# Patient Record
Sex: MALE | Race: WHITE | NOT HISPANIC OR LATINO | Employment: FULL TIME | URBAN - METROPOLITAN AREA
[De-identification: names, ages, dates, MRNs, and addresses within clinical notes are randomized per-mention and may not be internally consistent; named-entity substitution may affect disease eponyms.]

---

## 2023-06-23 ENCOUNTER — APPOINTMENT (OUTPATIENT)
Dept: PRIMARY CARE | Facility: CLINIC | Age: 33
End: 2023-06-23
Payer: COMMERCIAL

## 2023-09-20 ENCOUNTER — OFFICE VISIT (OUTPATIENT)
Dept: PRIMARY CARE | Facility: CLINIC | Age: 33
End: 2023-09-20
Payer: COMMERCIAL

## 2023-09-20 VITALS
SYSTOLIC BLOOD PRESSURE: 130 MMHG | HEART RATE: 77 BPM | TEMPERATURE: 98.2 F | WEIGHT: 198 LBS | HEIGHT: 71 IN | BODY MASS INDEX: 27.72 KG/M2 | DIASTOLIC BLOOD PRESSURE: 73 MMHG

## 2023-09-20 DIAGNOSIS — R10.9 FLANK PAIN: ICD-10-CM

## 2023-09-20 DIAGNOSIS — R51.9 CHRONIC DAILY HEADACHE: Primary | ICD-10-CM

## 2023-09-20 DIAGNOSIS — Z00.00 VISIT FOR PREVENTIVE HEALTH EXAMINATION: ICD-10-CM

## 2023-09-20 PROCEDURE — 99214 OFFICE O/P EST MOD 30 MIN: CPT | Performed by: FAMILY MEDICINE

## 2023-09-20 PROCEDURE — 1036F TOBACCO NON-USER: CPT | Performed by: FAMILY MEDICINE

## 2023-09-20 ASSESSMENT — PATIENT HEALTH QUESTIONNAIRE - PHQ9
2. FEELING DOWN, DEPRESSED OR HOPELESS: NOT AT ALL
1. LITTLE INTEREST OR PLEASURE IN DOING THINGS: NOT AT ALL
SUM OF ALL RESPONSES TO PHQ9 QUESTIONS 1 AND 2: 0

## 2023-09-20 ASSESSMENT — ENCOUNTER SYMPTOMS: HEADACHES: 1

## 2023-09-20 NOTE — PROGRESS NOTES
Subjective   Patient ID: Bebo George is a 33 y.o. male who presents for Headache (Pt is having headache on one side only on and off./Pt is having pain on the left side of rib cage./Pt is having pain on the elbow when touch .).  Chronic daily headache gettibng worse  Some tinnitis   history  Chronic daily headache for the last 4 to 6 weeks almost 2 months  Worsened by activity  No visual issues  Starting to get some occasional nausea  Notices it when he bends down or picks his head up quickly  No behavior changes but he is starting to get tired  History of head trauma and  remotely    Headache   This is a chronic problem. The current episode started more than 1 month ago. The problem occurs daily. The problem has been gradually worsening. The pain is located in the Bilateral region. The pain does not radiate. The pain quality is not similar to prior headaches. The quality of the pain is described as aching and stabbing. The pain is moderate. Associated symptoms include nausea. Pertinent negatives include no abdominal pain, abnormal behavior, anorexia, back pain, blurred vision, coughing, dizziness, drainage, ear pain, eye pain, eye redness, eye watering, facial sweating, fever, hearing loss, insomnia, loss of balance, muscle aches, neck pain, numbness, phonophobia, photophobia, rhinorrhea, scalp tenderness, seizures, sinus pressure, sore throat, swollen glands, tingling, tinnitus, visual change, vomiting, weakness or weight loss. The symptoms are aggravated by activity. He has tried acetaminophen and NSAIDs for the symptoms. The treatment provided no relief. There is no history of cancer, cluster headaches, hypertension, immunosuppression, migraine headaches, migraines in the family, obesity, pseudotumor cerebri, recent head traumas, sinus disease or TMJ.       Review of Systems   Constitutional:  Negative for fever and weight loss.   HENT:  Negative for ear pain, hearing loss, rhinorrhea, sinus  "pressure, sore throat and tinnitus.    Eyes:  Negative for blurred vision, photophobia, pain and redness.   Respiratory:  Negative for cough.    Gastrointestinal:  Positive for nausea. Negative for abdominal pain, anorexia and vomiting.   Musculoskeletal:  Negative for back pain and neck pain.   Neurological:  Positive for headaches. Negative for dizziness, tingling, seizures, weakness, numbness and loss of balance.   Psychiatric/Behavioral:  The patient does not have insomnia.    Constitutional: no chills, no fever and no night sweats.   Eyes: no blurred vision and no eyesight problems.   ENT: no hearing loss, no nasal congestion, no nasal discharge, no hoarseness and no sore throat.   Cardiovascular: no chest pain, no intermittent leg claudication, no lower extremity edema, no palpitations and no syncope.   Respiratory: no cough, no shortness of breath during exertion, no shortness of breath at rest and no wheezing.   Gastrointestinal: no abdominal pain, no blood in stools, no constipation, no diarrhea, no melena, no nausea, no rectal pain and no vomiting.   Genitourinary: no dysuria, no change in urinary frequency, no urinary hesitancy, no feelings of urinary urgency and no vaginal discharge.   Musculoskeletal: no arthralgias,  no back pain and no myalgias.   Integumentary: no new skin lesions and no rashes.   Neurological: no difficulty walking, no headache, no limb weakness, no numbness and no tingling.   Psychiatric: no anxiety, no depression, no anhedonia and no substance use disorders.   Endocrine: no recent weight gain and no recent weight loss.   Hematologic/Lymphatic: no tendency for easy bruising and no swollen glands .      Objective    /73   Pulse 77   Temp 36.8 °C (98.2 °F)   Ht 1.803 m (5' 11\")   Wt 89.8 kg (198 lb)   BMI 27.62 kg/m²    Physical Exam  The patient appeared well nourished and normally developed. Vital signs as documented. Head exam is unremarkable. No scleral icterus or " corneal arcus noted.  Pupils are equal round reactive to light extraocular movements are intact no hemorrhages noted on funduscopic exam mouth mucous membranes are moist no exudates ears canals clear TMs are gray pearly not injected nose no rhinorrhea or epistaxis Neck is without jugular venous distension, thyromegaly, or carotid bruits. Carotid upstrokes are brisk bilaterally. Lungs are clear to auscultation and percussion. Cardiac exam reveals the PMI to be normally sized and situated. Rhythm is regular. First and second heart sounds normal. No murmurs, rubs or gallops. Abdominal exam reveals normal bowel sounds, no masses, no organomegaly and no aortic enlargement. Extremities are nonedematous and both femoral and pedal pulses are normal.  Neurologic exam DTRs are equal bilaterally no focal deficits strength is symmetrical heme lymph no palpable lymph nodes in the neck axilla or groin    Assessment/Plan   Problem List Items Addressed This Visit       Chronic daily headache - Primary    Relevant Orders    CT head wo IV contrast    Flank pain    Relevant Orders    Comprehensive Metabolic Panel    CBC     Other Visit Diagnoses       Visit for preventive health examination        Relevant Orders    Lipid Panel    Hemoglobin A1C             Chronic daily headache  No improvement no migraine symptoms  Check CT to make sure not an intracranial issue  If normal will refer to headache clinic  Flank pain check labs to make sure there is no underlying infection  Close observation and follow-up    Elena Ha MD

## 2023-09-25 LAB
NON-UH HIE A/G RATIO: 1.4
NON-UH HIE ALB: 4.5 G/DL (ref 3.4–5)
NON-UH HIE ALK PHOS: 69 UNIT/L (ref 46–116)
NON-UH HIE BILIRUBIN, TOTAL: 0.8 MG/DL (ref 0.2–1)
NON-UH HIE BUN/CREAT RATIO: 14.5
NON-UH HIE BUN: 16 MG/DL (ref 9–23)
NON-UH HIE CALCIUM: 9.8 MG/DL (ref 8.7–10.4)
NON-UH HIE CALCULATED LDL CHOLESTEROL: 61 MG/DL (ref 60–130)
NON-UH HIE CALCULATED OSMOLALITY: 280 MOSM/KG (ref 275–295)
NON-UH HIE CHLORIDE: 106 MMOL/L (ref 98–107)
NON-UH HIE CHOLESTEROL: 138 MG/DL (ref 100–200)
NON-UH HIE CO2, VENOUS: 30 MMOL/L (ref 20–31)
NON-UH HIE CREATININE: 1.1 MG/DL (ref 0.6–1.1)
NON-UH HIE GFR AA: >60
NON-UH HIE GLOBULIN: 3.2 G/DL
NON-UH HIE GLOMERULAR FILTRATION RATE: >60 ML/MIN/1.73M?
NON-UH HIE GLUCOSE: 118 MG/DL (ref 74–106)
NON-UH HIE GOT: 24 UNIT/L (ref 15–37)
NON-UH HIE GPT: 25 UNIT/L (ref 10–49)
NON-UH HIE HCT: 47.2 % (ref 41–52)
NON-UH HIE HDL CHOLESTEROL: 66 MG/DL (ref 40–60)
NON-UH HIE HGB A1C: 4.9 %
NON-UH HIE HGB: 15.7 G/DL (ref 13.5–17.5)
NON-UH HIE INSTR WBC ND: 8.3
NON-UH HIE K: 4.6 MMOL/L (ref 3.5–5.1)
NON-UH HIE MCH: 28.3 PG (ref 27–34)
NON-UH HIE MCHC: 33.3 G/DL (ref 32–37)
NON-UH HIE MCV: 84.8 FL (ref 80–100)
NON-UH HIE MPV: 8.6 FL (ref 7.4–10.4)
NON-UH HIE NA: 139 MMOL/L (ref 135–145)
NON-UH HIE PLATELET: 202 X10 (ref 150–450)
NON-UH HIE RBC: 5.56 X10 (ref 4.7–6.1)
NON-UH HIE RDW: 13 % (ref 11.5–14.5)
NON-UH HIE TOTAL CHOL/HDL CHOL RATIO: 2.1
NON-UH HIE TOTAL PROTEIN: 7.7 G/DL (ref 5.7–8.2)
NON-UH HIE TRIGLYCERIDES: 57 MG/DL (ref 30–150)
NON-UH HIE WBC: 8.3 X10 (ref 4.5–11)

## 2023-09-30 PROBLEM — R10.9 FLANK PAIN: Status: ACTIVE | Noted: 2023-09-30

## 2023-09-30 PROBLEM — R51.9 CHRONIC DAILY HEADACHE: Status: ACTIVE | Noted: 2023-09-30

## 2023-09-30 ASSESSMENT — ENCOUNTER SYMPTOMS
ABNORMAL BEHAVIOR: 0
EYE REDNESS: 0
BACK PAIN: 0
SCALP TENDERNESS: 0
VISUAL CHANGE: 0
TINGLING: 0
BLURRED VISION: 0
FEVER: 0
EYE PAIN: 0
RHINORRHEA: 0
WEAKNESS: 0
SINUS PRESSURE: 0
SORE THROAT: 0
MIGRAINE HEADACHES: 0
PHOTOPHOBIA: 0
INSOMNIA: 0
NECK PAIN: 0
NUMBNESS: 0
COUGH: 0
VOMITING: 0
LOSS OF BALANCE: 0
CLUSTER HEADACHES: 0
ABDOMINAL PAIN: 0
EYE WATERING: 0
SEIZURES: 0
NAUSEA: 1
SWOLLEN GLANDS: 0
DIZZINESS: 0
FACIAL SWEATING: 0
ANOREXIA: 0
WEIGHT LOSS: 0

## 2023-10-30 ENCOUNTER — TELEPHONE (OUTPATIENT)
Dept: PRIMARY CARE | Facility: CLINIC | Age: 33
End: 2023-10-30
Payer: COMMERCIAL

## 2023-10-30 NOTE — TELEPHONE ENCOUNTER
Patient was here on 9/20 and forgot to change his insurance to [employer], it has been added in the chart can it please be rebilled.

## 2024-01-16 NOTE — TELEPHONE ENCOUNTER
Per billing, [place] was not billed, but [employer]MO paid even though it says that the policy ended on 8/26/23.  They will re-input [place] and research MMO situation.

## 2024-02-12 ENCOUNTER — APPOINTMENT (OUTPATIENT)
Dept: PRIMARY CARE | Facility: CLINIC | Age: 34
End: 2024-02-12
Payer: COMMERCIAL

## 2024-03-08 ENCOUNTER — OFFICE VISIT (OUTPATIENT)
Dept: PRIMARY CARE | Facility: CLINIC | Age: 34
End: 2024-03-08
Payer: COMMERCIAL

## 2024-03-08 VITALS
HEIGHT: 71 IN | OXYGEN SATURATION: 95 % | WEIGHT: 203 LBS | SYSTOLIC BLOOD PRESSURE: 126 MMHG | DIASTOLIC BLOOD PRESSURE: 74 MMHG | BODY MASS INDEX: 28.42 KG/M2 | TEMPERATURE: 97.5 F | HEART RATE: 85 BPM

## 2024-03-08 DIAGNOSIS — Z00.00 ANNUAL PHYSICAL EXAM: Primary | ICD-10-CM

## 2024-03-08 DIAGNOSIS — E66.3 OVERWEIGHT WITH BODY MASS INDEX (BMI) OF 28 TO 28.9 IN ADULT: ICD-10-CM

## 2024-03-08 PROBLEM — M75.42 ROTATOR CUFF IMPINGEMENT SYNDROME, LEFT: Status: RESOLVED | Noted: 2024-03-08 | Resolved: 2024-03-08

## 2024-03-08 PROBLEM — K40.90 INGUINAL HERNIA: Status: RESOLVED | Noted: 2024-03-08 | Resolved: 2024-03-08

## 2024-03-08 PROBLEM — R10.30 INGUINAL PAIN: Status: RESOLVED | Noted: 2024-03-08 | Resolved: 2024-03-08

## 2024-03-08 PROBLEM — L03.119 CELLULITIS, LEG: Status: RESOLVED | Noted: 2024-03-08 | Resolved: 2024-03-08

## 2024-03-08 PROCEDURE — 90715 TDAP VACCINE 7 YRS/> IM: CPT | Performed by: FAMILY MEDICINE

## 2024-03-08 PROCEDURE — 3008F BODY MASS INDEX DOCD: CPT | Performed by: FAMILY MEDICINE

## 2024-03-08 PROCEDURE — 90471 IMMUNIZATION ADMIN: CPT | Performed by: FAMILY MEDICINE

## 2024-03-08 PROCEDURE — 99395 PREV VISIT EST AGE 18-39: CPT | Performed by: FAMILY MEDICINE

## 2024-03-08 PROCEDURE — 1036F TOBACCO NON-USER: CPT | Performed by: FAMILY MEDICINE

## 2024-03-08 PROCEDURE — 93000 ELECTROCARDIOGRAM COMPLETE: CPT | Performed by: FAMILY MEDICINE

## 2024-03-08 ASSESSMENT — PATIENT HEALTH QUESTIONNAIRE - PHQ9
SUM OF ALL RESPONSES TO PHQ9 QUESTIONS 1 AND 2: 0
1. LITTLE INTEREST OR PLEASURE IN DOING THINGS: NOT AT ALL
2. FEELING DOWN, DEPRESSED OR HOPELESS: NOT AT ALL

## 2024-03-08 NOTE — PROGRESS NOTES
"Subjective   Patient ID: Bebo George is a 33 y.o. male who presents for Annual Exam (Yearly exam ).  Very pleasant 33-year-old does not know much family history because his father is adopted  He works outside the home and is not a smoker  He works on diet and exercise  Good support system with his family  No angina palpitations or syncope no fever chills or night sweats no change in bowel or bladder habits no new concerns  Does have family history of type 2 diabetes  No testicular masses no painful urination hearing and vision are grossly        Review of Systems  Constitutional: no chills, no fever and no night sweats.   Eyes: no blurred vision and no eyesight problems.   ENT: no hearing loss, no nasal congestion, no nasal discharge, no hoarseness and no sore throat.   Cardiovascular: no chest pain, no intermittent leg claudication, no lower extremity edema, no palpitations and no syncope.   Respiratory: no cough, no shortness of breath during exertion, no shortness of breath at rest and no wheezing.   Gastrointestinal: no abdominal pain, no blood in stools, no constipation, no diarrhea, no melena, no nausea, no rectal pain and no vomiting.   Genitourinary: no dysuria, no change in urinary frequency, no urinary hesitancy, no feelings of urinary urgency and no vaginal discharge.   Musculoskeletal: no arthralgias,  no back pain and no myalgias.   Integumentary: no new skin lesions and no rashes.   Neurological: no difficulty walking, no headache, no limb weakness, no numbness and no tingling.   Psychiatric: no anxiety, no depression, no anhedonia and no substance use disorders.   Endocrine: no recent weight gain and no recent weight loss.   Hematologic/Lymphatic: no tendency for easy bruising and no swollen glands .    Objective    /74   Pulse 85   Temp 36.4 °C (97.5 °F)   Ht 1.803 m (5' 11\")   Wt 92.1 kg (203 lb)   SpO2 95%   BMI 28.31 kg/m²    Physical Exam  The patient appeared well nourished and " Carlos Enrique Goldstein watched the pre op seminar online and received a preoperative education booklet in anticipation of surgery    Nurse Navigator normally developed. Vital signs as documented. Head exam is unremarkable. No scleral icterus or corneal arcus noted.  Pupils are equal round reactive to light extraocular movements are intact no hemorrhages noted on funduscopic exam mouth mucous membranes are moist no exudates ears canals clear TMs are gray pearly not injected nose no rhinorrhea or epistaxis Neck is without jugular venous distension, thyromegaly, or carotid bruits. Carotid upstrokes are brisk bilaterally. Lungs are clear to auscultation and percussion. Cardiac exam reveals the PMI to be normally sized and situated. Rhythm is regular. First and second heart sounds normal. No murmurs, rubs or gallops. Abdominal exam reveals normal bowel sounds, no masses, no organomegaly and no aortic enlargement. Extremities are nonedematous and both femoral and pedal pulses are normal.  Neurologic exam DTRs are equal bilaterally no focal deficits strength is symmetrical heme lymph no palpable lymph nodes in the neck axilla or groin    Assessment/Plan   Problem List Items Addressed This Visit       Annual physical exam - Primary     Healthy 33-year-old  Slightly elevated BMI advised diet and exercise  Biometric screening  Recommend updating vaccinations  Continue annual exams         Relevant Orders    Lipid Panel    Comprehensive Metabolic Panel    Hepatitis C antibody    ECG 12 Lead    Overweight with body mass index (BMI) of 28 to 28.9 in adult     Above normal BMI nutrition and physical activity reviewed                 Elena Ha MD

## 2024-03-29 PROBLEM — Z00.00 ANNUAL PHYSICAL EXAM: Status: ACTIVE | Noted: 2024-03-29

## 2024-03-29 PROBLEM — E66.3 OVERWEIGHT WITH BODY MASS INDEX (BMI) OF 28 TO 28.9 IN ADULT: Status: ACTIVE | Noted: 2024-03-29

## 2024-03-29 NOTE — ASSESSMENT & PLAN NOTE
Healthy 33-year-old  Slightly elevated BMI advised diet and exercise  Biometric screening  Recommend updating vaccinations  Continue annual exams

## 2024-08-26 ENCOUNTER — TELEPHONE (OUTPATIENT)
Dept: PRIMARY CARE | Facility: CLINIC | Age: 34
End: 2024-08-26
Payer: COMMERCIAL

## 2024-08-26 NOTE — TELEPHONE ENCOUNTER
Pt requested an appt for a physical. Pt made  aware that he was not due. Pt also requested orders for labs. Indicated orders were on the system and that he can go to any  lab to get the blood work done.   Pt c/o of a rash on his inner thigh and itchy x 10 days. Pt would like to now if he needed to be seen or if the provider can send a Rx .